# Patient Record
Sex: FEMALE | URBAN - METROPOLITAN AREA
[De-identification: names, ages, dates, MRNs, and addresses within clinical notes are randomized per-mention and may not be internally consistent; named-entity substitution may affect disease eponyms.]

---

## 2023-07-09 ENCOUNTER — TELEPHONE (OUTPATIENT)
Dept: OTHER | Facility: OTHER | Age: 58
End: 2023-07-09

## 2023-07-09 DIAGNOSIS — Z96.659 STATUS POST KNEE REPLACEMENT, UNSPECIFIED LATERALITY: Primary | ICD-10-CM

## 2023-07-09 RX ORDER — OXYCODONE HYDROCHLORIDE 5 MG/1
5 TABLET ORAL EVERY 6 HOURS PRN
Qty: 10 TABLET | Refills: 0 | Status: SHIPPED | OUTPATIENT
Start: 2023-07-09 | End: 2023-07-10 | Stop reason: SDUPTHER

## 2023-07-09 RX ORDER — OXYCODONE HYDROCHLORIDE 10 MG/1
10 TABLET ORAL EVERY 6 HOURS PRN
Qty: 10 TABLET | Refills: 0 | Status: SHIPPED | OUTPATIENT
Start: 2023-07-09 | End: 2023-07-10

## 2023-07-09 NOTE — PROGRESS NOTES
Escript for oxycodone. Breathing – normal variations in rate and rhythm, unlabored; grunting absent or intermittent and improving; intercostal, supracostal and subcostal muscles with normal excursion and not retracting; breath sounds are clear or mildly bronchovesicular, symmetric, with adequate intensity and without rales. Detailed exam

## 2023-07-09 NOTE — TELEPHONE ENCOUNTER
Kyra ojeda/ Cristobal Gordon called to verify medication order for new admission. TC message sent out.

## 2023-07-10 ENCOUNTER — NURSING HOME VISIT (OUTPATIENT)
Dept: GERIATRICS | Facility: OTHER | Age: 58
End: 2023-07-10
Payer: MEDICARE

## 2023-07-10 VITALS
DIASTOLIC BLOOD PRESSURE: 80 MMHG | RESPIRATION RATE: 18 BRPM | SYSTOLIC BLOOD PRESSURE: 140 MMHG | OXYGEN SATURATION: 95 % | TEMPERATURE: 97.3 F | HEART RATE: 83 BPM

## 2023-07-10 DIAGNOSIS — D64.9 ANEMIA, UNSPECIFIED TYPE: Primary | ICD-10-CM

## 2023-07-10 DIAGNOSIS — M17.11 OSTEOARTHRITIS OF RIGHT KNEE, UNSPECIFIED OSTEOARTHRITIS TYPE: ICD-10-CM

## 2023-07-10 DIAGNOSIS — Z96.659 STATUS POST KNEE REPLACEMENT, UNSPECIFIED LATERALITY: ICD-10-CM

## 2023-07-10 PROCEDURE — 99306 1ST NF CARE HIGH MDM 50: CPT | Performed by: INTERNAL MEDICINE

## 2023-07-10 RX ORDER — CELECOXIB 200 MG/1
200 CAPSULE ORAL 2 TIMES DAILY
Qty: 20 CAPSULE | Refills: 0 | Status: SHIPPED | OUTPATIENT
Start: 2023-07-10 | End: 2023-07-20

## 2023-07-10 RX ORDER — METHOCARBAMOL 750 MG/1
750 TABLET, FILM COATED ORAL EVERY 8 HOURS PRN
COMMUNITY
End: 2023-07-10 | Stop reason: SDUPTHER

## 2023-07-10 RX ORDER — CELECOXIB 200 MG/1
200 CAPSULE ORAL 2 TIMES DAILY
COMMUNITY
End: 2023-07-10 | Stop reason: SDUPTHER

## 2023-07-10 RX ORDER — METFORMIN HYDROCHLORIDE EXTENDED-RELEASE TABLETS 500 MG/1
500 TABLET, FILM COATED, EXTENDED RELEASE ORAL
COMMUNITY
End: 2023-07-10

## 2023-07-10 RX ORDER — FERROUS SULFATE 325(65) MG
325 TABLET ORAL
Qty: 30 TABLET | Refills: 0 | Status: SHIPPED | OUTPATIENT
Start: 2023-07-10 | End: 2023-08-09

## 2023-07-10 RX ORDER — OXYCODONE HYDROCHLORIDE 5 MG/1
5 TABLET ORAL EVERY 6 HOURS PRN
Qty: 20 TABLET | Refills: 0 | Status: SHIPPED | OUTPATIENT
Start: 2023-07-10

## 2023-07-10 RX ORDER — NALOXONE HYDROCHLORIDE 4 MG/.1ML
SPRAY NASAL
Qty: 1 EACH | Refills: 1 | Status: SHIPPED | OUTPATIENT
Start: 2023-07-10 | End: 2024-07-09

## 2023-07-10 RX ORDER — METHOCARBAMOL 750 MG/1
750 TABLET, FILM COATED ORAL EVERY 8 HOURS PRN
Qty: 20 TABLET | Refills: 0 | Status: SHIPPED | OUTPATIENT
Start: 2023-07-10 | End: 2023-07-20

## 2023-07-10 RX ORDER — FERROUS SULFATE 325(65) MG
325 TABLET ORAL
COMMUNITY
End: 2023-07-10 | Stop reason: SDUPTHER

## 2023-07-10 NOTE — ASSESSMENT & PLAN NOTE
Status post right TKA. Patient was discharged to SNF for rehab however she is requesting to be discharged home with home PT services. Continue Tylenol and as needed oxycodone for pain. Patient remains on aspirin for DVT prophylaxis. She was additionally discharged on Celebrex.   Prescriptions  were electronically sent to patient's pharmacy

## 2023-07-10 NOTE — PROGRESS NOTES
[facility]  [address] [place] [address], [facility] [address]  [id number]    Nursing Home Admission    NAME: Sofía Mcmahon  AGE: 60 y.o. SEX: female [phone]      Patient Location     [facility]    Patient’s care was coordinated with nursing facility staff. Recent vitals, labs and updated medications were reviewed on [facility] system of facility. Past Medical, surgical, social, medication and allergy history and patient’s previous records reviewed. Assessment/Plan:    Osteoarthritis of right knee  Status post right TKA. Patient was discharged to SNF for rehab however she is requesting to be discharged home with home PT services. Continue Tylenol and as needed oxycodone for pain. Patient remains on aspirin for DVT prophylaxis. She was additionally discharged on Celebrex. Prescriptions  were electronically sent to patient's pharmacy    Diabetes mellitus type 2:  Hemoglobin A1c was 6.8 on 6/21/2023  Patient was discharged on metformin 500 mg daily however denies taking this medication at home. Discontinue metformin. Follow-up with PCP. Blood sugar was 187 earlier today    Anemia:  Continue iron supplements. CBC can be repeated through PCP. Hypertension:  Systolic blood pressure is noted to be between 140-148 range with diastolic readings of 80. Continue hydrochlorothiazide. Consider replacing HCTZ with lisinopril considering history of diabetes    Vitamin B12 deficiency:  Continue vitamin B12 supplements    Vitamin D deficiency:  Continue vitamin D supplements    GERD:  Stable on PPI    Chief Complaint     Status post right TKA    HPI       Patient is a 60 y.o. female past medical history significant for diabetes mellitus type 2, hypertension, GERD, osteoarthritis, vitamin B12 deficiency, anemia and vitamin D deficiency. Patient was recently hospitalized for elective right total knee arthroplasty. Patient had an uneventful hospital course.   Tolerated the procedure well.  She was subsequently discharged to SNF for rehab where she is being seen for posthospital admission. At the time of my evaluation patient is doing okay. Reports having intermittent pain involving right knee. Patient is requesting to be discharged home. She feels she will be more comfortable with home physical therapy        Past medical history:   Diabetes mellitus type 2  Hypertension  GERD  Vitamin B12 deficiency  Vitamin D deficiency  Anemia  Osteoarthritis of right knee    PSH:  Dental implant  Hernia repair  Tonsillectomy      Allergies:  Asthma betazole, ibuprofen, topiramate, MDS, penicillin, pork intolerance, latex, silver    Family History:  NA    Social history:  . Daughter is involved in her care. There is no history of any tobacco or alcohol abuse    Not on File       Current Outpatient Medications:   •  ASPIRIN 81 PO, Take 81 mg by mouth 2 (two) times a day, Disp: , Rfl:   •  celecoxib (CeleBREX) 200 mg capsule, Take 1 capsule (200 mg total) by mouth 2 (two) times a day for 10 days X 14 days, Disp: 20 capsule, Rfl: 0  •  cyanocobalamin (VITAMIN B-12) 50 MCG tablet, Take 50 mcg by mouth daily, Disp: , Rfl:   •  ferrous sulfate 325 (65 Fe) mg tablet, Take 1 tablet (325 mg total) by mouth daily with breakfast, Disp: 30 tablet, Rfl: 0  •  methocarbamol (ROBAXIN) 750 mg tablet, Take 1 tablet (750 mg total) by mouth every 8 (eight) hours as needed for muscle spasms for up to 10 days, Disp: 20 tablet, Rfl: 0  •  naloxone (NARCAN) 4 mg/0.1 mL nasal spray, Administer 1 spray into a nostril. If no response after 2-3 minutes, give another dose in the other nostril using a new spray., Disp: 1 each, Rfl: 1  •  oxyCODONE (Roxicodone) 5 immediate release tablet, Take 1 tablet (5 mg total) by mouth every 6 (six) hours as needed for moderate pain Max Daily Amount: 20 mg, Disp: 20 tablet, Rfl: 0    Updated list was reviewed in [facility] of facility.      Vitals:    07/10/23 0825   BP: 140/80   Pulse: 83   Resp: 18   Temp: (!) 97.3 °F (36.3 °C)   SpO2: 95%       Vital signs were reviewed in point click care    Review of Systems   Constitutional: Positive for fatigue. Negative for chills and fever. HENT: Negative for nosebleeds and rhinorrhea. Eyes: Negative for discharge and redness. Respiratory: Negative for cough, chest tightness, shortness of breath, wheezing and stridor. Cardiovascular: Negative for chest pain and leg swelling. Gastrointestinal: Negative for abdominal distention, abdominal pain, diarrhea and vomiting. Genitourinary: Negative for dysuria, flank pain and hematuria. Musculoskeletal: Positive for gait problem. Negative for arthralgias and back pain. Skin: Negative for pallor. Neurological: Positive for weakness (Generalized). Negative for tremors, seizures, syncope and headaches. Psychiatric/Behavioral: Negative for agitation, behavioral problems and confusion. Physical Exam  Constitutional:       General: She is not in acute distress. HENT:      Head: Normocephalic and atraumatic. Nose: No rhinorrhea. Eyes:      General: No scleral icterus. Right eye: No discharge. Left eye: No discharge. Cardiovascular:      Rate and Rhythm: Normal rate and regular rhythm. Pulmonary:      Breath sounds: No wheezing, rhonchi or rales. Abdominal:      General: There is no distension. Palpations: Abdomen is soft. Tenderness: There is no abdominal tenderness. There is no guarding. Musculoskeletal:      Cervical back: Neck supple. Right lower leg: No edema. Left lower leg: No edema. Skin:     Coloration: Skin is not jaundiced. Neurological:      General: No focal deficit present. Mental Status: Mental status is at baseline. Cranial Nerves: No cranial nerve deficit.    Psychiatric:         Mood and Affect: Mood normal.         Behavior: Behavior normal.           Diagnostic Data     Labs done on 6/21/2023 revealed WBC count of 6.9, hemoglobin 12.5, platelet count 463  Creatinine 0.57, BUN 16, sodium 141, potassium 4.2, hemoglobin A1c 6.8     Code Status:      Full code    Additional notes:      Patient is being discharged per her request.  Medications are related to chronically sent to the pharmacy. Follow-up with PCP in 1 week continue PT OT at home    Portions of the record may have been created with voice recognition software. Occasional wrong word or "sound a like" substitutions may have occurred due to the inherent limitations of voice recognition software. Read the chart carefully and recognize, using context, where substitutions have occurred.     This note was electronically signed by Dr. Román Nguyen